# Patient Record
Sex: MALE | Race: WHITE
[De-identification: names, ages, dates, MRNs, and addresses within clinical notes are randomized per-mention and may not be internally consistent; named-entity substitution may affect disease eponyms.]

---

## 2021-08-11 ENCOUNTER — HOSPITAL ENCOUNTER (INPATIENT)
Dept: HOSPITAL 47 - EC | Age: 46
LOS: 3 days | Discharge: HOME | DRG: 247 | End: 2021-08-14
Attending: INTERNAL MEDICINE | Admitting: INTERNAL MEDICINE
Payer: COMMERCIAL

## 2021-08-11 VITALS — BODY MASS INDEX: 28.9 KG/M2

## 2021-08-11 DIAGNOSIS — Z87.891: ICD-10-CM

## 2021-08-11 DIAGNOSIS — I44.7: ICD-10-CM

## 2021-08-11 DIAGNOSIS — Z98.61: ICD-10-CM

## 2021-08-11 DIAGNOSIS — I21.4: Primary | ICD-10-CM

## 2021-08-11 DIAGNOSIS — I25.2: ICD-10-CM

## 2021-08-11 DIAGNOSIS — Z20.822: ICD-10-CM
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
## 2021-08-11 DIAGNOSIS — Z79.899: ICD-10-CM

## 2021-08-11 DIAGNOSIS — Z79.82: ICD-10-CM

## 2021-08-11 DIAGNOSIS — Z79.02: ICD-10-CM

## 2021-08-11 DIAGNOSIS — E78.5: ICD-10-CM

## 2021-08-11 DIAGNOSIS — I25.5: ICD-10-CM

## 2021-08-11 DIAGNOSIS — M19.011: ICD-10-CM

## 2021-08-11 DIAGNOSIS — I25.10: ICD-10-CM

## 2021-08-11 DIAGNOSIS — I10: ICD-10-CM

## 2021-08-11 LAB
ALBUMIN SERPL-MCNC: 4.1 G/DL (ref 3.5–5)
ALP SERPL-CCNC: 51 U/L (ref 38–126)
ALT SERPL-CCNC: 33 U/L (ref 4–49)
ANION GAP SERPL CALC-SCNC: 10 MMOL/L
APTT BLD: 21.3 SEC (ref 22–30)
AST SERPL-CCNC: 40 U/L (ref 17–59)
BASOPHILS # BLD AUTO: 0.1 K/UL (ref 0–0.2)
BASOPHILS NFR BLD AUTO: 1 %
BUN SERPL-SCNC: 21 MG/DL (ref 9–20)
CALCIUM SPEC-MCNC: 9.3 MG/DL (ref 8.4–10.2)
CHLORIDE SERPL-SCNC: 110 MMOL/L (ref 98–107)
CK SERPL-CCNC: 236 U/L (ref 55–170)
CO2 SERPL-SCNC: 21 MMOL/L (ref 22–30)
EOSINOPHIL # BLD AUTO: 0.3 K/UL (ref 0–0.7)
EOSINOPHIL NFR BLD AUTO: 4 %
ERYTHROCYTE [DISTWIDTH] IN BLOOD BY AUTOMATED COUNT: 4.87 M/UL (ref 4.3–5.9)
ERYTHROCYTE [DISTWIDTH] IN BLOOD: 12.6 % (ref 11.5–15.5)
GLUCOSE SERPL-MCNC: 141 MG/DL (ref 74–99)
HCT VFR BLD AUTO: 43.9 % (ref 39–53)
HGB BLD-MCNC: 15.1 GM/DL (ref 13–17.5)
INR PPP: 0.9 (ref ?–1.2)
LYMPHOCYTES # SPEC AUTO: 3.1 K/UL (ref 1–4.8)
LYMPHOCYTES NFR SPEC AUTO: 43 %
MAGNESIUM SPEC-SCNC: 1.9 MG/DL (ref 1.6–2.3)
MCH RBC QN AUTO: 31 PG (ref 25–35)
MCHC RBC AUTO-ENTMCNC: 34.4 G/DL (ref 31–37)
MCV RBC AUTO: 90.2 FL (ref 80–100)
MONOCYTES # BLD AUTO: 0.4 K/UL (ref 0–1)
MONOCYTES NFR BLD AUTO: 6 %
NEUTROPHILS # BLD AUTO: 3.1 K/UL (ref 1.3–7.7)
NEUTROPHILS NFR BLD AUTO: 42 %
PLATELET # BLD AUTO: 204 K/UL (ref 150–450)
POTASSIUM SERPL-SCNC: 3.8 MMOL/L (ref 3.5–5.1)
PROT SERPL-MCNC: 6.6 G/DL (ref 6.3–8.2)
PT BLD: 10.1 SEC (ref 9–12)
SODIUM SERPL-SCNC: 141 MMOL/L (ref 137–145)
TROPONIN I SERPL-MCNC: 0.02 NG/ML (ref 0–0.03)
WBC # BLD AUTO: 7.3 K/UL (ref 3.8–10.6)

## 2021-08-11 PROCEDURE — 82550 ASSAY OF CK (CPK): CPT

## 2021-08-11 PROCEDURE — 80061 LIPID PANEL: CPT

## 2021-08-11 PROCEDURE — 83880 ASSAY OF NATRIURETIC PEPTIDE: CPT

## 2021-08-11 PROCEDURE — 85025 COMPLETE CBC W/AUTO DIFF WBC: CPT

## 2021-08-11 PROCEDURE — 027034Z DILATION OF CORONARY ARTERY, ONE ARTERY WITH DRUG-ELUTING INTRALUMINAL DEVICE, PERCUTANEOUS APPROACH: ICD-10-PCS

## 2021-08-11 PROCEDURE — 93458 L HRT ARTERY/VENTRICLE ANGIO: CPT

## 2021-08-11 PROCEDURE — 80053 COMPREHEN METABOLIC PANEL: CPT

## 2021-08-11 PROCEDURE — B2151ZZ FLUOROSCOPY OF LEFT HEART USING LOW OSMOLAR CONTRAST: ICD-10-PCS

## 2021-08-11 PROCEDURE — 82565 ASSAY OF CREATININE: CPT

## 2021-08-11 PROCEDURE — 36415 COLL VENOUS BLD VENIPUNCTURE: CPT

## 2021-08-11 PROCEDURE — 83735 ASSAY OF MAGNESIUM: CPT

## 2021-08-11 PROCEDURE — 93005 ELECTROCARDIOGRAM TRACING: CPT

## 2021-08-11 PROCEDURE — 71045 X-RAY EXAM CHEST 1 VIEW: CPT

## 2021-08-11 PROCEDURE — 93306 TTE W/DOPPLER COMPLETE: CPT

## 2021-08-11 PROCEDURE — 02C13ZZ EXTIRPATION OF MATTER FROM CORONARY ARTERY, TWO ARTERIES, PERCUTANEOUS APPROACH: ICD-10-PCS

## 2021-08-11 PROCEDURE — 4A023N7 MEASUREMENT OF CARDIAC SAMPLING AND PRESSURE, LEFT HEART, PERCUTANEOUS APPROACH: ICD-10-PCS

## 2021-08-11 PROCEDURE — 82553 CREATINE MB FRACTION: CPT

## 2021-08-11 PROCEDURE — 92973 PRQ TRLUML C MCHN ASP THRMBC: CPT

## 2021-08-11 PROCEDURE — 80048 BASIC METABOLIC PNL TOTAL CA: CPT

## 2021-08-11 PROCEDURE — 85610 PROTHROMBIN TIME: CPT

## 2021-08-11 PROCEDURE — 84484 ASSAY OF TROPONIN QUANT: CPT

## 2021-08-11 PROCEDURE — B2111ZZ FLUOROSCOPY OF MULTIPLE CORONARY ARTERIES USING LOW OSMOLAR CONTRAST: ICD-10-PCS

## 2021-08-11 PROCEDURE — 92921: CPT

## 2021-08-11 PROCEDURE — 85730 THROMBOPLASTIN TIME PARTIAL: CPT

## 2021-08-11 RX ADMIN — MIDAZOLAM ONE MG: 1 INJECTION INTRAMUSCULAR; INTRAVENOUS at 23:52

## 2021-08-11 RX ADMIN — FENTANYL CITRATE ONE MCG: 50 INJECTION, SOLUTION INTRAMUSCULAR; INTRAVENOUS at 23:52

## 2021-08-11 NOTE — ED
Chest Pain HPI





- General


Chief Complaint: Chest Pain


Stated Complaint: Possible STEMI


Time Seen by Provider: 08/11/21 23:05


Source: patient, EMS, RN notes reviewed, old records reviewed


Mode of arrival: EMS


Limitations: no limitations





- History of Present Illness


Initial Comments: 





This is a 46-year-old male to the ER for evaluation patient presents today for 

evaluation regards to significant shortness of breath patient has may be a 

history of high blood pressure, unsure of any other medical history disease.  No

prior history of heart disease but does have a strong family history of heart 

disease a young age with his grandfather having heart attack young age.  Patient

himself has no recent travel history or sick contacts.  No fevers no cough or c

ongestion.  Still short of breath he does feel diaphoretic.  Patient was walking

with his kids when he became significantly diaphoretic and short of breath with 

chest pressure.  Left-sided chest pressure and pain has continued here in the ER

symptoms about a half hour prior to arrival


MD Complaint: chest pain


-: minutes(s) (30)


Onset: during rest, during exertion


Pain Location: left chest


Pain Radiation: none


Severity: moderate


Severity scale (1-10): 7


Quality: tightness, heaviness


Consistency: constant, intermittent


Improves With: nothing


Worsens With: exertion


Anginal Symptoms: nausea, diaphoresis, dyspnea, sense of impending doom


Treatments Prior to Arrival: none





- Related Data


                                    Allergies











Allergy/AdvReac Type Severity Reaction Status Date / Time


 


No Known Allergies Allergy   Verified 08/11/21 23:09














Review of Systems


ROS Statement: 


Those systems with pertinent positive or pertinent negative responses have been 

documented in the HPI.





ROS Other: All systems not noted in ROS Statement are negative.





Past Medical History


History of Any Multi-Drug Resistant Organisms: None Reported


Past Psychological History: No Psychological Hx Reported


Smoking Status: Never smoker


Past Alcohol Use History: None Reported


Past Drug Use History: None Reported





General Exam





- General Exam Comments


Initial Comments: 





Diaphoretic


Limitations: no limitations


General appearance: anxious


Head exam: Present: atraumatic, normocephalic, normal inspection


Eye exam: Present: normal appearance, PERRL, EOMI.  Absent: scleral icterus, 

conjunctival injection, periorbital swelling


ENT exam: Present: normal exam, mucous membranes moist


Neck exam: Present: normal inspection.  Absent: tenderness, meningismus, 

lymphadenopathy


Respiratory exam: Present: normal lung sounds bilaterally.  Absent: respiratory 

distress, wheezes, rales, rhonchi, stridor


Cardiovascular Exam: Present: regular rate, normal rhythm, normal heart sounds. 

Absent: systolic murmur, diastolic murmur, rubs, gallop, clicks


GI/Abdominal exam: Present: soft, normal bowel sounds.  Absent: distended, 

tenderness, guarding, rebound, rigid


Extremities exam: Present: normal inspection, full ROM, normal capillary refill.

 Absent: tenderness, pedal edema, joint swelling, calf tenderness


Back exam: Present: normal inspection


Neurological exam: Present: alert, oriented X3, CN II-XII intact


Psychiatric exam: Present: normal affect, normal mood


Skin exam: Present: warm, dry, intact, normal color.  Absent: rash





Course


                                   Vital Signs











  08/11/21





  23:05


 


Temperature 97.7 F


 


Pulse Rate 87


 


Respiratory 20





Rate 


 


Blood Pressure 136/97


 


O2 Sat by Pulse 98





Oximetry 














- Reevaluation(s)


Reevaluation #1: 





08/11/21 23:21


Medical record is reviewed


08/11/21 23:21


Code STEMI was paged upon arrival with patient significant symptoms


Reevaluation #2: 





08/11/21 23:21


No prior EKG on file


Reevaluation #3: 





08/11/21 23:21


Spoke with patient regarding symptoms and need for heart catheterization, he 

understands





- Consultations


Consultation #1: 





Spoke with Dr. Singletary regarding findings, Cath Lab is paged.


Consultation #2: 





Spoke with Dr. Ac regarding admission he agrees





Chest Pain MDM





- Differential Diagnosis


AMI





- MDM





46 male DF for evaluation of chest pain pressure diaphoresis, patient will be 

admitted to the Cath Lab for heart catheterization regarding symptoms.





Critical Care Time


Critical Care Time: Yes


Total Critical Care Time: 31





Disposition


Clinical Impression: 


 Chest pain, Acute non-ST elevation myocardial infarction (NSTEMI)





Disposition: ADMITTED AS IP TO THIS HOSP


Condition: Serious


Is patient prescribed a controlled substance at d/c from ED?: No


Referrals: 


Francisco Malik MD [Primary Care Provider] - 1-2 days

## 2021-08-11 NOTE — XR
EXAMINATION TYPE: XR chest 1V

 

DATE OF EXAM: 8/11/2021

 

COMPARISON: NONE

 

HISTORY: Chest pain

 

TECHNIQUE: Single view

 

FINDINGS: Heart and mediastinum are normal. There is slight increased interstitial markings. There is
 no pleural effusion. There are no hilar masses. There are chest leads. There is arthritic change in 
the right shoulder joint.

 

IMPRESSION: Mild increased lung markings. No pulmonary consolidation or heart failure.

## 2021-08-11 NOTE — P.CRDCN
History of Present Illness


History of present illness: 





HISTORY OF PRESENTING ILLNESS


This is a pleasant 46-year-old with past medical history significant for family 

history of coronary artery disease and orthopedic complaints.  Patient was campi

ng and walking with his son earlier today and then started to notice shortness 

breath, left-sided chest pain radiating down the arm and associated with 

diaphoresis.  It was somewhat off and on however then became more intense.  

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  

He was given 4 baby aspirin and nitroglycerin with some improvement in his chest

pain.  Initial EKG showed sinus rhythm with left bundle branch block with 

accentuated discordant ST elevations in V3 V4 concerning for Scarbossa's 

criteria however this did improve on repeat EKG by the time he presented to 

emergency department.  Patient still is having ongoing mild chest discomfort 

however much improved from beginning.  He does have a family history of 

grandfather with MI in his 40s however has been checked with supposedly normal 

cholesterol in the past.  He does not smoke, drinks a few beers most days, no 

illicit drugs.  No prior history of left bundle-branch block.








REVIEW OF SYSTEMS


At the time of my exam:


CONSTITUTIONAL: Denies fever or chills.


CARDIOVASCULAR: +chest pain, +shortness of breath, no orthopnea, PND or 

palpitations.


RESPIRATORY: Denies cough. 


GASTROINTESTINAL: Denies abdominal pain, diarrhea, constipation, nausea or 

vomiting.


MUSCULOSKELETAL: Denies myalgias.


NEUROLOGIC: Denies numbness, tingling or weakness.


ENDOCRINE: Denies fatigue, weight change,  polydipsia or polyurina.


GENITOURINARY: Denies burning, hematuria or urgency with micturation.


HEMATOLOGIC: Denies history of anemia or bleeding. 





PHYSICAL EXAMINATION


Vital signs reviewed.


CONSTITUTIONAL: Mild distress. 


HEENT: Head is normocephalic. Pupils are equal, round. Sclerae anicteric. Mucous

membranes of the mouth are moist.  No JVD. No carotid bruit.


CHEST EXAMINATION: Lungs are clear to auscultation. No chest wall tenderness is 

noted on palpation or with deep breathing. 


HEART EXAMINATION: Regular rate and rhythm. S1, S2 heard. No murmurs, gallops or

rub.


ABDOMEN: Soft, nontender. Positive bowel sounds.


EXTREMITIES: 2+ peripheral pulses, no lower extremity edema and no calf 

tenderness.


NEUROLOGIC EXAMINATION: Patient is awake, alert and oriented x3. 





ASSESSMENT


1.  New-onset left bundle branch block with initial EKG concerning for 

Scarbossa's criteria, consistent with STEMI equivalent


2.  Chest pain, diaphoresis, shortness breath concerning for acute coronary 

syndrome


3.  Alcohol use


4.  Family history of coronary artery disease





PLAN


Patient with signs and symptoms consistent with acute coronary syndrome.  EKG 

with new onset left bundle branch block concerning for STEMI equivalent.  

Discussed risks and benefits of heart catheterization and patient is agreeable. 

Check 2-D echo.  Trend troponins.  Further recommendations to follow.











Past Medical History


History of Any Multi-Drug Resistant Organisms: None Reported


Past Psychological History: No Psychological Hx Reported


Smoking Status: Never smoker


Past Alcohol Use History: None Reported


Past Drug Use History: None Reported





Medications and Allergies


                                Home Medications











 Medication  Instructions  Recorded  Confirmed  Type


 


Diclofenac Sodium [Voltaren] 75 mg PO BID 08/11/21 08/11/21 History








                                    Allergies











Allergy/AdvReac Type Severity Reaction Status Date / Time


 


No Known Allergies Allergy   Verified 08/11/21 23:09














Physical Exam


Vitals: 


                                   Vital Signs











  Temp Pulse Resp BP Pulse Ox


 


 08/11/21 23:05  97.7 F  87  20  136/97  98








                                Intake and Output











 08/11/21 08/11/21 08/12/21





 14:59 22:59 06:59


 


Other:   


 


  Weight   97.522 kg














Results





                                 08/11/21 23:15





                                       CBC











  08/11/21 Range/Units





  23:15 


 


WBC  7.3  (3.8-10.6)  k/uL


 


RBC  4.87  (4.30-5.90)  m/uL


 


Hgb  15.1  (13.0-17.5)  gm/dL


 


Hct  43.9  (39.0-53.0)  %


 


Plt Count  204  (150-450)  k/uL








                               Current Medications











Generic Name Dose Route Start Last Admin





  Trade Name Freq  PRN Reason Stop Dose Admin


 


Aspirin  325 mg  08/12/21 09:00 





  Aspirin 325 Mg Tab  PO  





  DAILY SHRUTHI  


 


Sodium Chloride  1,000 mls @ 100 mls/hr  08/11/21 23:10  08/11/21 23:17





  Saline 0.9%  IV  08/12/21 09:09  100 mls/hr





  .Q10H STA   Administration


 


Sodium Chloride  1,000 mls @ 100 mls/hr  08/11/21 23:15  08/11/21 23:19





  Saline 0.9%  IV   Not Given





  .Q10H SHRUTHI  








                                Intake and Output











 08/11/21 08/11/21 08/12/21





 14:59 22:59 06:59


 


Other:   


 


  Weight   97.522 kg








                                 Patient Weight











 08/12/21





 06:59


 


Weight 97.522 kg








                                        





                                 08/11/21 23:15

## 2021-08-12 LAB
ANION GAP SERPL CALC-SCNC: 7 MMOL/L
APTT BLD: 39.8 SEC (ref 22–30)
BASOPHILS # BLD AUTO: 0 K/UL (ref 0–0.2)
BASOPHILS NFR BLD AUTO: 0 %
BUN SERPL-SCNC: 20 MG/DL (ref 9–20)
CALCIUM SPEC-MCNC: 8.9 MG/DL (ref 8.4–10.2)
CHLORIDE SERPL-SCNC: 108 MMOL/L (ref 98–107)
CHOLEST SERPL-MCNC: 151 MG/DL (ref 0–200)
CO2 SERPL-SCNC: 22 MMOL/L (ref 22–30)
EOSINOPHIL # BLD AUTO: 0 K/UL (ref 0–0.7)
EOSINOPHIL NFR BLD AUTO: 0 %
ERYTHROCYTE [DISTWIDTH] IN BLOOD BY AUTOMATED COUNT: 4.48 M/UL (ref 4.3–5.9)
ERYTHROCYTE [DISTWIDTH] IN BLOOD: 13.2 % (ref 11.5–15.5)
GLUCOSE BLD-MCNC: 127 MG/DL (ref 75–99)
GLUCOSE SERPL-MCNC: 162 MG/DL (ref 74–99)
HCT VFR BLD AUTO: 40.2 % (ref 39–53)
HDLC SERPL-MCNC: 38 MG/DL (ref 40–60)
HGB BLD-MCNC: 13.8 GM/DL (ref 13–17.5)
INR PPP: 1 (ref ?–1.2)
LDLC SERPL CALC-MCNC: 100.2 MG/DL (ref 0–131)
LYMPHOCYTES # SPEC AUTO: 0.9 K/UL (ref 1–4.8)
LYMPHOCYTES NFR SPEC AUTO: 11 %
MCH RBC QN AUTO: 30.7 PG (ref 25–35)
MCHC RBC AUTO-ENTMCNC: 34.2 G/DL (ref 31–37)
MCV RBC AUTO: 89.9 FL (ref 80–100)
MONOCYTES # BLD AUTO: 0.5 K/UL (ref 0–1)
MONOCYTES NFR BLD AUTO: 6 %
NEUTROPHILS # BLD AUTO: 7.1 K/UL (ref 1.3–7.7)
NEUTROPHILS NFR BLD AUTO: 82 %
PLATELET # BLD AUTO: 214 K/UL (ref 150–450)
POTASSIUM SERPL-SCNC: 4 MMOL/L (ref 3.5–5.1)
PT BLD: 10.6 SEC (ref 9–12)
SODIUM SERPL-SCNC: 137 MMOL/L (ref 137–145)
TRIGL SERPL-MCNC: 64 MG/DL (ref 0–149)
VLDLC SERPL CALC-MCNC: 12.8 MG/DL (ref 5–40)
WBC # BLD AUTO: 8.7 K/UL (ref 3.8–10.6)

## 2021-08-12 RX ADMIN — HYDRALAZINE HYDROCHLORIDE ONE MG: 20 INJECTION INTRAMUSCULAR; INTRAVENOUS at 01:09

## 2021-08-12 RX ADMIN — HYDRALAZINE HYDROCHLORIDE ONE MG: 20 INJECTION INTRAMUSCULAR; INTRAVENOUS at 01:14

## 2021-08-12 RX ADMIN — ATORVASTATIN CALCIUM SCH MG: 80 TABLET, FILM COATED ORAL at 08:24

## 2021-08-12 RX ADMIN — NITROGLYCERIN ONE MCG: 10 INJECTION INTRAVENOUS at 00:28

## 2021-08-12 RX ADMIN — METOPROLOL TARTRATE SCH MG: 25 TABLET, FILM COATED ORAL at 08:38

## 2021-08-12 RX ADMIN — TICAGRELOR SCH MG: 90 TABLET ORAL at 08:38

## 2021-08-12 RX ADMIN — MIDAZOLAM ONE MG: 1 INJECTION INTRAMUSCULAR; INTRAVENOUS at 00:52

## 2021-08-12 RX ADMIN — NITROGLYCERIN ONE MCG: 10 INJECTION INTRAVENOUS at 00:40

## 2021-08-12 RX ADMIN — METOPROLOL TARTRATE SCH MG: 25 TABLET, FILM COATED ORAL at 21:32

## 2021-08-12 RX ADMIN — NITROGLYCERIN ONE MCG: 10 INJECTION INTRAVENOUS at 00:17

## 2021-08-12 RX ADMIN — HEPARIN SODIUM ONE UNIT: 1000 INJECTION INTRAVENOUS; SUBCUTANEOUS at 00:33

## 2021-08-12 RX ADMIN — NITROGLYCERIN ONE MCG: 10 INJECTION INTRAVENOUS at 00:48

## 2021-08-12 RX ADMIN — NITROGLYCERIN ONE MCG: 10 INJECTION INTRAVENOUS at 01:05

## 2021-08-12 RX ADMIN — SPIRONOLACTONE SCH MG: 25 TABLET, FILM COATED ORAL at 08:24

## 2021-08-12 RX ADMIN — FUROSEMIDE SCH MG: 20 TABLET ORAL at 21:31

## 2021-08-12 RX ADMIN — HEPARIN SODIUM SCH MLS/HR: 10000 INJECTION, SOLUTION INTRAVENOUS at 23:30

## 2021-08-12 RX ADMIN — FUROSEMIDE SCH MG: 20 TABLET ORAL at 08:24

## 2021-08-12 RX ADMIN — HEPARIN SODIUM SCH MLS/HR: 10000 INJECTION, SOLUTION INTRAVENOUS at 03:11

## 2021-08-12 RX ADMIN — FENTANYL CITRATE ONE MCG: 50 INJECTION, SOLUTION INTRAMUSCULAR; INTRAVENOUS at 00:52

## 2021-08-12 RX ADMIN — HEPARIN SODIUM ONE UNIT: 1000 INJECTION INTRAVENOUS; SUBCUTANEOUS at 00:01

## 2021-08-12 RX ADMIN — TICAGRELOR SCH MG: 90 TABLET ORAL at 21:31

## 2021-08-12 RX ADMIN — NITROGLYCERIN ONE MCG: 10 INJECTION INTRAVENOUS at 00:37

## 2021-08-12 RX ADMIN — HEPARIN SODIUM PRN UNIT: 1000 INJECTION, SOLUTION INTRAVENOUS; SUBCUTANEOUS at 08:24

## 2021-08-12 RX ADMIN — ASPIRIN 81 MG CHEWABLE TABLET SCH MG: 81 TABLET CHEWABLE at 08:24

## 2021-08-12 RX ADMIN — NITROGLYCERIN ONE MCG: 10 INJECTION INTRAVENOUS at 00:53

## 2021-08-12 RX ADMIN — METOPROLOL TARTRATE SCH MG: 25 TABLET, FILM COATED ORAL at 02:37

## 2021-08-12 RX ADMIN — HEPARIN SODIUM ONE UNIT: 1000 INJECTION INTRAVENOUS; SUBCUTANEOUS at 00:12

## 2021-08-12 RX ADMIN — CEFAZOLIN SCH MLS/HR: 330 INJECTION, POWDER, FOR SOLUTION INTRAMUSCULAR; INTRAVENOUS at 03:13

## 2021-08-12 NOTE — P.PRCINT
Percutaneous Coronary Int.





- Percutaneous Coronary Intervention


Percutaneous Coronary Intervention: 





PROCEDURES PERFORMED: Left heart catheterization, bilateral coronary 

angiography, PCI proximal LAD with a 3.25 x 18mm Xience LOLY, with kissing 

balloon angioplasty of LAD and diagonal 2 branch, Penumbra aspiration thromb

ectomy





INDICATION: ACS, STEMI equivalent





HISTORY: Patient is pleasant 46-year-old male with a family history of 

grandfather with MI in his 40s, occasional alcohol use who presented with chest 

pain while walking to his children while camping.  Chest pain was somewhat off 

and on however he recognizes symptoms early and presented to ER with new left 

bundle-branch block, shortness breath and diaphoresis and therefore STEMI alert 

was notified.  Given new left bundle-branch block in typical symptoms left heart

catheterization was recommended.





CONSENT:I have discussed the risks, benefits and alternative therapies for the 

above-mentioned procedure and for both sedation/analgesia as well as necessary 

blood product administration, if indicated, as they pertain to this patient.  

The patient has indicated understanding and acceptance of the risks and 

procedures discussed.





PROCEDURE: After the risks, benefits and alternatives of the above mentioned 

procedure explained in detail with the patient, informed consent was obtained.  

Patient was taken to the catheterization lab and prepped and draped in usual 

fashion.  1% lidocaine was used to anesthetize the right radial artery.  A 6-

[place] sheath was placed in the right radial artery using modified Seldinger 

technique.  Left coronary angiography was performed with a 5-[place] JL 3.5 

catheter and right coronary angiography was performed with a 5-[place] JR5 

catheter in various views.  A 5-[place] FR5 catheter was inserted into the left 

ventricle and pressure measurements were obtained.  





The decision was made to perform PCI of the LAD.  A 6-[place] CLS 3.5 guide was 

used to engage the left main.  Heparin was given for ACT greater than 250.  A 

0.014 BMW wire was advanced into the distal LAD and penumbra aspiration was 

performed given high thrombus burden.  There was additional involvement of the 

diagonal 2 branch and therefore a 0.014 whisper wire was inserted into the 

distal diagonal 2 branch.  Balloon angioplasty was performed of the ostium of 

the diagonal 2 branch with a 2.5 x 12 mm wound and then a 3.0 x 15 mm balloon.  

Next balloon angioplasty was performed of the LAD with a 3.0 x 15 mm balloon.  

Next a 3.25 x 18 mm Xience LOLY was placed just distal to the diagonal 1 branch 

and jailing the diagonal 2 branch.  There was significant "pinching" of the 

diagonal 2 branch and therefore a second 0.014 BMW wire was advanced into the 

diagonal 2 branch and the initial whisper wire was removed.  Next balloon 

angioplasty was performed of the diagonal 2 branch with a 2.0 x 12 mm balloon 

through the stent struts.  Next a kissing angioplasty was performed with a 3.0 x

12 mm noncompliant balloon in the LAD and a 2.5 x 15 mm noncompliant balloon in 

the diagonal 2 branch.  The wires were pulled and final angiograms were 

performed.  Preintervention there was GRAHAM 3 flow of the LAD and diagonal 2 

branch with 95% stenosis of the LAD and 95% stenosis of the diagonal 2 branch 

and postintervention there was less than 10% stenosis of the LAD and 30% 

stenosis of the diagonal 2 branch which was ballooned with GRAHAM 3 flow of both. 

Patient was still having mild chest pain throughout the case felt related to 

distal embolization and microvascular dysfunction.





The right radial sheath was removed and a TR band was placed with hemostasis 

achieved.  The patient tolerated the procedure well.  Patient was transported 

back to the post catheterization holding area in stable condition.





Conscious Sedation: Patient was monitored under the direct supervision of vision

of myself for conscious sedation using Versed and fentanyl for a total duration 

of 90 minutes   


HEMODYNAMICS: 


Ao: 128/92


LV: 130/12, LVEDP 31 mmHG





SELECTIVE CORONARY ARTERIOGRAPHY: 


LEFT MAIN: The left main is a large caliber vessel which bifurcates into the LAD

and circumflex.  There is no significant stenosis.


LEFT ANTERIOR DESCENDING CORONARY ARTERY: LAD is a large caliber vessel which 

wraps around to the apex.  There is a proximal LAD 95% stenosis extending into 

the diagonal 2 branch with a large amount of thrombus.  Diagonal 2 has a 95% 

ostial stenosis.  There is mid LAD 20% stenosis and otherwise normal LAD.


LEFT CIRCUMFLEX CORONARY ARTERY: Left circumflex is a large caliber vessel with 

only mild luminal irregularities.  The circumflex supplies the PDA and is the 

dominant vessel.  There are mild luminal irregularities of the circumflex. 


RIGHT CORONARY ARTERY: The right coronary artery is a small caliber vessel which

gives off an acute marginal branch and has no signficant stenosis and is 

nondominant.  








FINAL IMPRESSION: 


1.  CAD as described above including proximal LAD 95% and diagonal 2 95% 

stenosis at the level of the LAD/diagonal 2 bifurcation. 


2.  Severely elevated left filling pressures


3.  S/p PCI proximal LAD with a 3.25 x 18mm Xience LOLY, with kissing balloon 

angioplasty of LAD and diagonal 2 branch





PLAN: 


1.  Aggressive risk factor modification per most recent ACC/AHA guidelines.


2.  Continue dual antiplatelets for 12 months.

## 2021-08-12 NOTE — PN
PROGRESS NOTE



Mr. Dunne is 46-year-old male with history of premature coronary artery disease who

presented with symptoms of chest discomfort and left bundle branch block, underwent

cardiac catheterization by Dr. Singletary, was found to have significant obstructive

disease involving the LAD and underwent stenting of the LAD using a 3.25 by 18 mm

Xience 5 point stent with balloon angioplasty of the diagonal branch.  He had some

chest discomfort postprocedure.  This morning his chest discomfort is almost resolved

completely.  His left ventricular end-diastolic pressure was elevated.  His breathing

is better this morning.  He is being maintained on IV heparin and IV nitroglycerin.  He

denies any dizziness or palpitation.  He continues to be in sinus mechanism.  He has no

palpitation.  Denies any nausea or vomiting.  He has no prior cardiac history.  He

continues to be at this time on IV heparin, IV nitroglycerin, aspirin once a day,

metoprolol tartrate 25 mg twice a day, Brilinta 90 mg twice a day.



PHYSICAL EXAMINATION:

Blood pressure running in the low 100s with a heart rate in 70s.

LUNGS:  Clear.

HEART:  Regular rate and rhythm.  S1, S2.  No S3.  No rub appreciated.

ABDOMEN:  Soft, nontender.

EXTREMITIES:  No edema.  Right radial pulse intact.



LAB DATA:

Lab data revealed troponin of 11.6, BUN and creatinine 20 and 0.8, hemoglobin is 13.8.



IMPRESSION:

1. Status post anterior myocardial infarction and stenting of the LAD and angioplasty

    of the diagonal branch.

2. Elevated left ventricular end-diastolic pressure consistent with the acute ischemic

    event.

3. Family history of premature coronary disease.



RECOMMENDATION:

From the cardiac standpoint I will wean the IV nitroglycerin.  I will start him on oral

nitrate and ACE inhibitor.  I will add Aldactone and a statin to his regimen. An

echocardiogram with Doppler will be obtained.  Depending on his progress, further

recommendation will be made.



MMODL / IJN: [id number] / Job#: [id number]

## 2021-08-12 NOTE — ECHOF
Referral Reason:mi



MEASUREMENTS

--------

HEIGHT: 188.0 cm

WEIGHT: 102.1 kg

BP: 102/62

RVIDd:   3.0 cm     (< 3.3)

IVSd:   1.6 cm     (0.6 - 1.1)

LVIDd:   5.2 cm     (3.9 - 5.3)

LVPWd:   1.4 cm     (0.6 - 1.1)

IVSs:   1.9 cm

LVIDs:   3.7 cm

LVPWs:   1.9 cm

LA Diam:   3.7 cm     (2.7 - 3.8)

LAESV Index (A-L):   25.07 ml/m

Ao Diam:   3.5 cm     (2.0 - 3.7)

AV Cusp:   2.6 cm     (1.5 - 2.6)

MV EXCURSION:   20.477 mm     (> 18.000)

MV EF SLOPE:   130 mm/s     (70 - 150)

EPSS:   0.7 cm

MV E Sixto:   0.84 m/s

MV DecT:   151 ms

MV A Sixto:   0.73 m/s

MV E/A Ratio:   1.15 

TAPSE:   19.78 mm







FINDINGS

--------

Sinus rhythm.

This was a technically good study.

The left ventricular size is normal.   There is moderate concentric left ventricular hypertrophy.   O
verall left ventricular systolic function is moderately impaired with, an EF between 35 - 40 %.   Api
oracio anterior LV wall motion is hypokinetic.    Apical lateral LV wall motion is hypokinetic.    Apica
l inferior LV wall motion is hypokinetic.    Apical septum LV wall motion is hypokinetic.

The right ventricle is normal in size.

Normal LA  size by volume 22+/-6 ml/m2.

The right atrium is normal in size.

Interatrial and interventricular septum intact.

The aortic valve is trileaflet, and appears structurally normal. No aortic stenosis or regurgitation.


There is trace to mild mitral regurgitation.

The tricuspid valve appears structurally normal.   Unable to estimate RVSP due to inadequate TR jet s
pectral doppler profile.

The pulmonic valve is normal.

The aortic root size is normal.

Normal inferior vena cava with normal inspiratory collapse consistent with estimated right atrial pre
ssure of  5 mmHg.

There is no pericardial effusion.



CONCLUSIONS

--------

1. The left ventricular size is normal.

2. There is moderate concentric left ventricular hypertrophy.

3. Overall left ventricular systolic function is moderately impaired with, an EF between 35 - 40 %.

4. Apical anterior LV wall motion is hypokinetic.

5. Apical lateral LV wall motion is hypokinetic.

6. Apical inferior LV wall motion is hypokinetic.

7. Apical septum LV wall motion is hypokinetic.

8. The aortic valve is trileaflet, and appears structurally normal. No aortic stenosis or regurgitati
on.

9. There is trace to mild mitral regurgitation.

10. There is no pericardial effusion.





SONOGRAPHER: Corrie Gauthier RDCS

## 2021-08-12 NOTE — P.HPIM
History of Present Illness


H&P Date: 21








HISTORY OF PRESENT ILLNESS


This is a 46-year-old male patient of Dr. Malik with history of OA right 

shoulder s/p injections.  Patient states that he was camping with his family and

he was carrying his son started developing a little bit of chest pain which she 

has had before but it usually goes away.  He went to carry his second son who is

lighter in weight had increasing tightness, pain in the left arm, sweats, 

nausea, palpitations and lightheadedness.  EMS was contacted and patient was 

transferred to [facility] emergency center for evaluation.  Vi

jina signs are stable.  EKG was a left bundle branch block with ST elevation in 

V3 V4, cardiology was contacted and patient was taken emergently to the cath lab

and underwent left heart catheterization, bilateral coronary angiography, PCI 

proximal LAD with a 3.25 x 18mm Xience LOLY, with kissing balloon angioplasty of 

LAD and diagonal 2 branch, Penumbra aspiration thrombectomy with Dr. Singletary.  

Patient is currently maintained in the intensive care unit on heparin drip and 

nitroglycerin drip.  He states he occasionally gets some chest pain on the left 

side.  He has been hemodynamically stable.





REVIEW OF SYSTEMS


Constitutional: No fever, no chills, Reported sweats.  No weight change.  No 

weakness, fatigue or lethargy.  No daytime sleepiness.


EENT: No headache.  No blurred vision or double vision, no loss of vision.  No 

dizziness.  No nasal drainage or congestion.  No epistaxis.  No sore throat.


Lungs: No shortness of breath, cough, no sputum production.  No wheezing.


Cardiovascular: Reported chest pain, no lower extremity edema.  No palpitations.

 No paroxysmal nocturnal dyspnea.  No orthopnea.  No lightheadedness or 

dizziness.  No syncopal episodes.


Abdominal: No abdominal pain.  Reported nausea, vomiting.  No diarrhea.  No 

constipation.  No bloody or tarry stools.  No loss of appetite.


Genitourinary: No dysuria, increased frequency, urgency.  No urinary retention.


Musculoskeletal: No myalgias.  No muscle weakness, no gait dysfunction, no 

frequent falls.  No back pain.  No neck pain.


Integumentary: No wounds, no lesions.  No rash or pruritus.  No unusual 

bruising.  No change in hair or nails.


Neurologic: No aphasia. No facial droop. No change in mentation. No head injury.

No headache. No paralysis. No paresthesia.


Psychiatric: No depression.  No anxiety.  No mood swings.


Endocrine: No abnormal blood sugars.   





MEDICAL HISTORY


Osteoarthritis right shoulder





SURGICAL HISTORY


Ankle surgery





SOCIAL HISTORY


Patients smoked for a few years while he was in the [employer] at less than pack per 

day for 5 years.  He does drink alcohol about 3 drinks on his nights off work.  

Patient lives at home with his wife and 2 sons.  He works as a  at

the [employer].





FAMILY HISTORY


Mother is alive at age 68 with history of hypertension and smoking.  Father 

at age 58 from consultations from diabetes.  Patient has a total of 6 siblings 

and one sister has passed at age 33 from a brain cancer.  All other siblings are

alive with no major medical problems and no cardiac history.  Patient has 1 

paternal grandfather that  of a myocardial infarction in his 30s.  Patient 

has 2 sons and one 7-year-old has neuroblastoma.  Second son has no major 

medical problems.





PHYSICAL EXAMINATION


Gen: This is a 46-year-old  male.  He is resting in the ICU bed and 

appears to be comfortable and in no acute distress.


HEENT: Head is atraumatic, normocephalic. Pupils equal, round. Sclerae is 

anicteric. 


NECK: Supple. No JVD. No lymphadenopathy. No thyromegaly. 


LUNGS: Clear to auscultation. No wheezes or rhonchi.  No intercostal 

retractions.


HEART: Regular rate and rhythm. No murmur. 


ABDOMEN: Soft. Bowel sounds are present. No masses.  No tenderness.


EXTREMITIES: No pedal edema.  No calf tenderness.  Dorsalis pedis +2 

bilaterally.


NEUROLOGICAL: Patient is awake, alert and oriented x3. Cranial nerves 2 through 

12 are grossly intact. 





ASSESSMENT AND PLAN


1. New-onset left bundle branch block with initial EKG concerning for 

Scarbossa's criteria, consistent with STEMI equivalent.  Status post left heart 

catheterization, bilateral coronary angiography, PCI LAD with kissing balloon 

angioplasty of the LAD and diagonal 2 branch, aspiration thrombectomy.  Patient 

is maintained in the intensive care unit.  He is currently on heparin drip and 

nitroglycerin drip.  Continue aspirin 81 mg daily, Lipitor 80 mg daily, Lasix 20

mg twice daily, Imdur 30 mg daily, lisinopril 2.5 mg twice daily, Lopressor 25 

mg twice daily, Aldactone 25 mg daily, Brilinta 90 mg twice daily.  

Echocardiogram is pending





2.  Osteoarthritis right shoulder, stable.





3.  Alcohol use.





4.  GI prophylaxis.  Protonix.





5.  DVT prophylaxis.  Heparin.








DISCHARGE PLAN


Home





Impression and plan of care have been directed as dictated by the signing 

physician.  Judi Feng nurse practitioner acting as scribe for signing 

physician.








Past Medical History


Past Medical History: Hypertension, Myocardial Infarction (MI)


Last Myocardial Infarction Date:: 21


History of Any Multi-Drug Resistant Organisms: None Reported


Past Surgical History: Heart Catheterization With Stent


Date of Last Stent Placement:: 21


Past Psychological History: No Psychological Hx Reported


Smoking Status: Former smoker


Past Alcohol Use History: Occasional


Additional Past Alcohol Use History / Comment(s): drinks approximately 10 

"drinks" per week


Past Drug Use History: None Reported





- Past Family History


  ** Father


Family Medical History: Diabetes Mellitus





  ** Mother


Family Medical History: Hypertension





Medications and Allergies


                                Home Medications











 Medication  Instructions  Recorded  Confirmed  Type


 


Diclofenac Sodium [Voltaren] 75 mg PO BID 21 History


 


Ticagrelor [Brilinta] 90 mg PO BID #60 tab 21  Rx








                                    Allergies











Allergy/AdvReac Type Severity Reaction Status Date / Time


 


No Known Allergies Allergy   Verified 21 23:09














Physical Exam


Vitals: 


                                   Vital Signs











  Temp Pulse Resp BP Pulse Ox


 


 21 09:00   73  16  116/82  96


 


 21 08:00  98 F  66  16  117/75  97


 


 21 07:00   75  11 L  102/62  97


 


 21 06:45   76  11 L  105/60  97


 


 21 06:30   73  18  114/74  97


 


 21 06:15   72  19  115/71  97


 


 21 06:00  98.5 F  68  16  111/59  97


 


 21 05:45   77  16  106/63  97


 


 21 05:30   84  17  110/63  97


 


 21 05:15   75  17  123/79  96


 


 21 05:00   71  17  116/72  96


 


 21 04:45   75  14  123/75  96


 


 21 04:30   82  16  119/78  96


 


 21 04:15   80  19  123/78  97


 


 21 04:00  98.5 F  84  16  124/70  98


 


 21 03:45   89  11 L  116/73  97


 


 21 03:41      96


 


 21 03:30   86  11 L  108/74  96


 


 21 03:15   86  10 L  112/72  94 L


 


 21 03:00   78  12  112/71  97


 


 21 02:45   84  23  114/71  95


 


 21 02:30   82  15   95


 


 21 02:20    18  


 


 21 02:15   84  13  127/85  96


 


 21 02:00   81  12  119/81  96


 


 21 23:35   78  12  141/103  98


 


 21 23:30   80  12  131/101  98


 


 21 23:25   79  12  136/104  99


 


 21 23:15   80  12  133/100  98


 


 21 23:05  97.7 F  87  20  136/97  98








                                Intake and Output











 21





 22:59 06:59 14:59


 


Intake Total  820 239.25


 


Output Total  1200 


 


Balance  -380 239.25


 


Intake:   


 


  IV  820 40


 


    Sodium Chloride 0.9% 1,  120 40





    000 ml @ 20 mls/hr IV .   





    Q24H SHRUTHI Rx#:[id number]   


 


  Intake, IV Titration   199.25





  Amount   


 


    Heparin Sod,Pork in 0.45%   46





    NaCl 25,000 unit In 0.45   





    % NaCl 1 250ml.bag @ 10.   





    254 UNITS/KG/HR 10 mls/hr   





    IV .Q24H SHRUTHI Rx#:   





    [id number]   


 


    Nitroglycerin-D5w Pmx 50   153.25





    mg In Dextrose/Water 1   





    250ml.bag @ 100 MCG/MIN   





    30 mls/hr IV .Q8H20M SHRUTHI   





    Rx#:[id number]   


 


Output:   


 


  Urine  1200 


 


Other:   


 


  # Voids  1 


 


  Weight  102.3 kg 102.3 kg














Results


CBC & Chem 7: 


                                 21 03:23





                                 21 03:28


Labs: 


                  Abnormal Lab Results - Last 24 Hours (Table)











  21 Range/Units





  23:15 23:15 01:49 


 


Lymphocytes #     (1.0-4.8)  k/uL


 


APTT  21.3 L    (22.0-30.0)  sec


 


Chloride   110 H   ()  mmol/L


 


Carbon Dioxide   21 L   (22-30)  mmol/L


 


BUN   21 H   (9-20)  mg/dL


 


Glucose   141 H   (74-99)  mg/dL


 


POC Glucose (mg/dL)    127 H  (75-99)  mg/dL


 


Creatine Kinase   236 H   ()  U/L


 


Troponin I     (0.000-0.034)  ng/mL


 


HDL Cholesterol     (40.0-60.0)  mg/dL














  21 Range/Units





  03:23 03:23 03:23 


 


Lymphocytes #    0.9 L  (1.0-4.8)  k/uL


 


APTT     (22.0-30.0)  sec


 


Chloride     ()  mmol/L


 


Carbon Dioxide     (22-30)  mmol/L


 


BUN     (9-20)  mg/dL


 


Glucose     (74-99)  mg/dL


 


POC Glucose (mg/dL)     (75-99)  mg/dL


 


Creatine Kinase     ()  U/L


 


Troponin I  11.600 H*    (0.000-0.034)  ng/mL


 


HDL Cholesterol   38.0 L   (40.0-60.0)  mg/dL














  21 Range/Units





  03:23 03:28 07:17 


 


Lymphocytes #     (1.0-4.8)  k/uL


 


APTT  39.8 H   33.3 H  (22.0-30.0)  sec


 


Chloride   108 H   ()  mmol/L


 


Carbon Dioxide     (22-30)  mmol/L


 


BUN     (9-20)  mg/dL


 


Glucose   162 H   (74-99)  mg/dL


 


POC Glucose (mg/dL)     (75-99)  mg/dL


 


Creatine Kinase     ()  U/L


 


Troponin I     (0.000-0.034)  ng/mL


 


HDL Cholesterol     (40.0-60.0)  mg/dL














  21 Range/Units





  07:17 


 


Lymphocytes #   (1.0-4.8)  k/uL


 


APTT   (22.0-30.0)  sec


 


Chloride   ()  mmol/L


 


Carbon Dioxide   (22-30)  mmol/L


 


BUN   (9-20)  mg/dL


 


Glucose   (74-99)  mg/dL


 


POC Glucose (mg/dL)   (75-99)  mg/dL


 


Creatine Kinase   ()  U/L


 


Troponin I  23.100 H*  (0.000-0.034)  ng/mL


 


HDL Cholesterol   (40.0-60.0)  mg/dL














Thrombosis Risk Factor Assmnt





- Choose All That Apply


Each Factor Represents 1 point: Acute MI, Age 41-60 years, Obesity (BMI >25)


Thrombosis Risk Factor Assessment Total Risk Factor Score: 3


Thrombosis Risk Factor Assessment Level: Moderate Risk

## 2021-08-13 VITALS — RESPIRATION RATE: 16 BRPM

## 2021-08-13 RX ADMIN — SPIRONOLACTONE SCH MG: 25 TABLET, FILM COATED ORAL at 09:19

## 2021-08-13 RX ADMIN — HEPARIN SODIUM PRN UNIT: 1000 INJECTION, SOLUTION INTRAVENOUS; SUBCUTANEOUS at 06:03

## 2021-08-13 RX ADMIN — CEFAZOLIN SCH MLS/HR: 330 INJECTION, POWDER, FOR SOLUTION INTRAMUSCULAR; INTRAVENOUS at 06:05

## 2021-08-13 RX ADMIN — FUROSEMIDE SCH MG: 20 TABLET ORAL at 09:19

## 2021-08-13 RX ADMIN — TICAGRELOR SCH MG: 90 TABLET ORAL at 09:19

## 2021-08-13 RX ADMIN — ATORVASTATIN CALCIUM SCH MG: 80 TABLET, FILM COATED ORAL at 09:19

## 2021-08-13 RX ADMIN — METOPROLOL TARTRATE SCH MG: 25 TABLET, FILM COATED ORAL at 20:38

## 2021-08-13 RX ADMIN — METOPROLOL TARTRATE SCH MG: 25 TABLET, FILM COATED ORAL at 09:19

## 2021-08-13 RX ADMIN — ASPIRIN 81 MG CHEWABLE TABLET SCH MG: 81 TABLET CHEWABLE at 09:19

## 2021-08-13 RX ADMIN — TICAGRELOR SCH MG: 90 TABLET ORAL at 20:38

## 2021-08-13 NOTE — PN
PROGRESS NOTE



Mr. Dunne is a 46-year-old male with no prior documented history of coronary 
artery

disease who presented with an acute myocardial infarction. He underwent coronary

angiography and stenting of the LAD by Dr. Singletary.  He is doing well this 
morning.

He has no chest pain. His breathing has been stable. He denies any dizziness or

palpitation. He denies any nausea.  He continues to be in sinus mechanism. He 
continues

to be on aspirin once a day, Lipitor 80 mg daily, Lasix 20 mg twice a day, IV

heparin, isosorbide mononitrate 30 mg daily, lisinopril 2.5 mg twice a day, 
metoprolol

tartrate 25 mg twice a day, spironolactone 25 mg daily, Brilinta 90 mg twice a 
day.



PHYSICAL EXAMINATION:

Blood pressure 103/60 with a heart rate in the 70s.

LUNGS:  Clear.

HEART: Regular rate and rhythm. S1, S2.  No S3.  No rub.

ABDOMEN:  Soft, nontender.

EXTREMITIES:  No edema.



LAB DATA:

Lab data revealed a peak troponin of 28 and his NT-proBNP was 1270.  His 
echocardiogram

revealed an ejection fraction of 35% to 40% with controlled apical and 
inferoapical

hypokinesis consistent with his myocardial infarction.



IMPRESSION:

1. Status post myocardial infarction involving the LAD, status post stenting.

2. Ischemic cardiomyopathy.  Hopefully stunt  myocardium in view of the low

    peak of his troponin.

3. Family history of premature coronary disease.



RECOMMENDATIONS:

I will stop the heparin.  I will stop the nitrates at this time, decrease the 
dose of

the diuretic, and increase the dose of his lisinopril.  He should be able to be

transferred to the telemetry floor and increase his level activity. Depending on
his

progress, further recommendations will be made.





MMODL / IJN: [id number] / Job#: [id number]

MTDD

## 2021-08-13 NOTE — P.PN
Subjective


Progress Note Date: 08/13/21





HISTORY OF PRESENT ILLNESS


This is a 46-year-old male patient of Dr. Malik with history of OA right 

shoulder s/p injections.  Patient states that he was camping with his family and

he was carrying his son started developing a little bit of chest pain which she 

has had before but it usually goes away.  He went to carry his second son who is

lighter in weight had increasing tightness, pain in the left arm, sweats, 

nausea, palpitations and lightheadedness.  EMS was contacted and patient was 

transferred to [facility] emergency center for evaluation.  

Vital signs are stable.  EKG was a left bundle branch block with ST elevation in

V3 V4, cardiology was contacted and patient was taken emergently to the cath lab

and underwent left heart catheterization, bilateral coronary angiography, PCI 

proximal LAD with a 3.25 x 18mm Xience LOLY, with kissing balloon angioplasty of 

LAD and diagonal 2 branch, Penumbra aspiration thrombectomy with Dr. Singletary.  

Patient is currently maintained in the intensive care unit on heparin drip and 

nitroglycerin drip.  He states he occasionally gets some chest pain on the left 

side.  He has been hemodynamically stable.





8/13: Echocardiogram reveals EF of 35-40% with moderate concentric left 

hypertrophy, mild mitral regurgitation.  Patient remains in the intensive care 

unit.  No chest pain, no shortness of breath, no lightheadedness or dizziness.  

Patient is off nitroglycerin drip and off heparin drip.  Patient is to be t

ransferred to the cardiac stepdown unit.  Patient has been afebrile, heart rate 

79, blood pressure 122/67, pulse ox 97% on room air.





REVIEW OF SYSTEMS


Constitutional: No fever, no chills, Reported sweats.  No weight change.  No 

weakness, fatigue or lethargy.  No daytime sleepiness.


EENT: No headache.  No blurred vision or double vision, no loss of vision.  No 

dizziness.  No nasal drainage or congestion.  No epistaxis.  No sore throat.


Lungs: No shortness of breath, cough, no sputum production.  No wheezing.


Cardiovascular: Denies chest pain, no lower extremity edema.  No palpitations.  

No paroxysmal nocturnal dyspnea.  No orthopnea.  No lightheadedness or 

dizziness.  No syncopal episodes.


Abdominal: No abdominal pain.  Reported nausea, vomiting.  No diarrhea.  No 

constipation.  No bloody or tarry stools.  No loss of appetite.


Genitourinary: No dysuria, increased frequency, urgency.  No urinary retention.


Musculoskeletal: No myalgias.  No muscle weakness, no gait dysfunction, no 

frequent falls.  No back pain.  No neck pain.


Integumentary: No wounds, no lesions.  No rash or pruritus.  No unusual 

bruising.  No change in hair or nails.


Neurologic: No aphasia. No facial droop. No change in mentation. No head injury.

No headache. No paralysis. No paresthesia.


Psychiatric: No depression.  No anxiety.  No mood swings.


Endocrine: No abnormal blood sugars.   





PHYSICAL EXAMINATION


Gen: This is a 46-year-old  male.  He is resting in the ICU bed and 

appears to be comfortable and in no acute distress.


HEENT: Head is atraumatic, normocephalic. Pupils equal, round. Sclerae is 

anicteric. 


NECK: Supple. No JVD. No lymphadenopathy. No thyromegaly. 


LUNGS: Clear to auscultation. No wheezes or rhonchi.  No intercostal 

retractions.


HEART: Regular rate and rhythm. No murmur. 


ABDOMEN: Soft. Bowel sounds are present. No masses.  No tenderness.


EXTREMITIES: No pedal edema.  No calf tenderness.  Dorsalis pedis +2 

bilaterally.


NEUROLOGICAL: Patient is awake, alert and oriented x3. Cranial nerves 2 through 

12 are grossly intact. 





ASSESSMENT AND PLAN


1. New-onset left bundle branch block with initial EKG concerning for 

Scarbossa's criteria, consistent with STEMI equivalent.  Status post left heart 

catheterization, bilateral coronary angiography, PCI LAD with kissing balloon 

angioplasty of the LAD and diagonal 2 branch, aspiration thrombectomy.  Patient 

is maintained in the intensive care unit.   Continue aspirin 81 mg daily, 

Lipitor 80 mg daily, Lasix 20 mg twice daily, Imdur 30 mg daily, lisinopril 2.5 

mg twice daily, Lopressor 25 mg twice daily, Aldactone 25 mg daily, Brilinta 90 

mg twice daily.  Echocardiogram as above.  Transfer to cardiac stepdown unit





2.  Osteoarthritis right shoulder, stable.





3.  Alcohol use.





4.  GI prophylaxis.  Protonix.





5.  DVT prophylaxis.  Heparin.








DISCHARGE PLAN


Home





Impression and plan of care have been directed as dictated by the signing 

physician.  Judi Feng nurse practitioner acting as scribe for signing 

physician.








Objective





- Vital Signs


Vital signs: 


                                   Vital Signs











Temp  98.6 F   08/13/21 08:00


 


Pulse  72   08/13/21 09:00


 


Resp  21   08/13/21 08:00


 


BP  101/59   08/13/21 08:00


 


Pulse Ox  96   08/13/21 09:00








                                 Intake & Output











 08/12/21 08/13/21 08/13/21





 18:59 06:59 18:59


 


Intake Total 419.25 546.527 20


 


Output Total 1650 2325 0


 


Balance -1230.75 -1778.473 20


 


Weight 102.3 kg 97.4 kg 


 


Intake:   


 


   260 20


 


    Sodium Chloride 0.9% 1, 220 260 20





    000 ml @ 20 mls/hr IV .   





    Q24H SHRUTHI Rx#:[id number]   


 


  Intake, IV Titration 199.25 286.527 





  Amount   


 


    Heparin Sod,Pork in 0.45% 46 286.527 





    NaCl 25,000 unit In 0.45   





    % NaCl 1 250ml.bag @ 10.   





    254 UNITS/KG/HR 10 mls/hr   





    IV .Q24H SHRUTHI Rx#:   





    [id number]   


 


    Nitroglycerin-D5w Pmx 50 153.25  





    mg In Dextrose/Water 1   





    250ml.bag @ 100 MCG/MIN   





    30 mls/hr IV .Q8H20M SHRUTHI   





    Rx#:[id number]   


 


Output:   


 


  Urine 1650 2325 0


 


Other:   


 


  # Voids  1 














- Labs


CBC & Chem 7: 


                                 08/12/21 03:23





                                 08/13/21 03:45


Labs: 


                  Abnormal Lab Results - Last 24 Hours (Table)











  08/12/21 08/12/21 08/12/21 Range/Units





  09:47 13:01 13:01 


 


APTT    55.2 H  (22.0-30.0)  sec


 


Troponin I  28.000 H*  18.700 H*   (0.000-0.034)  ng/mL














  08/13/21 Range/Units





  03:45 


 


APTT  34.2 H  (22.0-30.0)  sec


 


Troponin I   (0.000-0.034)  ng/mL

## 2021-08-14 VITALS — TEMPERATURE: 97.9 F | HEART RATE: 95 BPM

## 2021-08-14 VITALS — SYSTOLIC BLOOD PRESSURE: 110 MMHG | DIASTOLIC BLOOD PRESSURE: 69 MMHG

## 2021-08-14 LAB
ANION GAP SERPL CALC-SCNC: 9 MMOL/L
BUN SERPL-SCNC: 14 MG/DL (ref 9–20)
CALCIUM SPEC-MCNC: 9.7 MG/DL (ref 8.4–10.2)
CHLORIDE SERPL-SCNC: 104 MMOL/L (ref 98–107)
CO2 SERPL-SCNC: 26 MMOL/L (ref 22–30)
GLUCOSE BLD-MCNC: 84 MG/DL (ref 75–99)
GLUCOSE SERPL-MCNC: 106 MG/DL (ref 74–99)
POTASSIUM SERPL-SCNC: 4.2 MMOL/L (ref 3.5–5.1)
SODIUM SERPL-SCNC: 139 MMOL/L (ref 137–145)

## 2021-08-14 RX ADMIN — ASPIRIN 81 MG CHEWABLE TABLET SCH MG: 81 TABLET CHEWABLE at 08:45

## 2021-08-14 RX ADMIN — SPIRONOLACTONE SCH MG: 25 TABLET, FILM COATED ORAL at 08:45

## 2021-08-14 RX ADMIN — FUROSEMIDE SCH MG: 20 TABLET ORAL at 08:44

## 2021-08-14 RX ADMIN — ATORVASTATIN CALCIUM SCH MG: 80 TABLET, FILM COATED ORAL at 08:45

## 2021-08-14 RX ADMIN — METOPROLOL TARTRATE SCH MG: 25 TABLET, FILM COATED ORAL at 08:45

## 2021-08-14 RX ADMIN — TICAGRELOR SCH MG: 90 TABLET ORAL at 08:45

## 2021-08-14 NOTE — P.DS
Providers


Date of admission: 


08/11/21 23:10





Expected date of discharge: 08/14/21


Attending physician: 


Kobe Ac





Consults: 





                                        





08/11/21 23:10


Consult Physician Urgent 


   Consulting Provider: Tiffanie Leslie


   Consult Reason/Comments: stemi


   Do you want consulting provider notified?: Yes





08/12/21 01:50


Consult Physician Routine 


   Consulting Provider: [facility]


   Consult Reason/Comments: Post Interventional patient


   Do you want consulting provider notified?: Already Contacted











Primary care physician: 


Francisco Malik MD





Hospital Course: 





HISTORY OF PRESENT ILLNESS


This is a 46-year-old male patient of Dr. Malik with history of OA right 

shoulder s/p injections.  Patient states that he was camping with his family and

he was carrying his son started developing a little bit of chest pain which she 

has had before but it usually goes away.  He went to carry his second son who is

lighter in weight had increasing tightness, pain in the left arm, sweats, 

nausea, palpitations and lightheadedness.  EMS was contacted and patient was 

transferred to [facility] emergency center for evaluation.  

Vital signs are stable.  EKG was a left bundle branch block with ST elevation in

V3 V4, cardiology was contacted and patient was taken emergently to the cath lab

and underwent left heart catheterization, bilateral coronary angiography, PCI pr

oximal LAD with a 3.25 x 18mm Xience LOLY, with kissing balloon angioplasty of 

LAD and diagonal 2 branch, Penumbra aspiration thrombectomy with Dr. Singletary.  

Patient is currently maintained in the intensive care unit on heparin drip and 

nitroglycerin drip.  He states he occasionally gets some chest pain on the left 

side.  He has been hemodynamically stable.





8/13: Echocardiogram reveals EF of 35-40% with moderate concentric left 

hypertrophy, mild mitral regurgitation.  Patient remains in the intensive care 

unit.  No chest pain, no shortness of breath, no lightheadedness or dizziness.  

Patient is off nitroglycerin drip and off heparin drip.  Patient is to be 

transferred to the cardiac stepdown unit.  Patient has been afebrile, heart rate

79, blood pressure 122/67, pulse ox 97% on room air.





8/14: Patient denies having any chest pain, no shortness of breath.  He is 

ambulated and has not experienced lightheadedness or dizziness.  He has been 

afebrile, heart rate 95, blood pressure 110/69, pulse ox 97% on room air.  

Repeat blood work reveals normal BMP.  Blood sugar 106.  Patient will be 

discharged home today in stable condition.








ASSESSMENT AND PLAN


1. New-onset left bundle branch block with initial EKG concerning for 

Scarbossa's criteria, consistent with STEMI equivalent.  Status post left heart 

catheterization, bilateral coronary angiography, PCI LAD with kissing balloon 

angioplasty of the LAD and diagonal 2 branch, aspiration thrombectomy. 


2.  Osteoarthritis right shoulder, stable.


3.  Alcohol use.








DISCHARGE PLAN


Home





Impression and plan of care have been directed as dictated by the signing 

physician.  Judi Feng nurse practitioner acting as scribe for signing 

physician.








Patient Condition at Discharge: Good





Plan - Discharge Summary


New Discharge Prescriptions: 


New


   Ticagrelor [Brilinta] 90 mg PO BID #60 tab


   Spironolactone [Aldactone] 25 mg PO DAILY #30 tab


   Atorvastatin [Lipitor] 80 mg PO DAILY #30 tab


   Metoprolol Tartrate [Lopressor] 25 mg PO BID #60 tab


   Nitroglycerin Sl Tabs [Nitrostat] 0.4 mg SUBLINGUAL Q5M PRN #25 tab


     PRN Reason: Chest Pain


   lisinopriL [Zestril] 5 mg PO BID #60 tab


   Aspirin 81 mg PO DAILY  chew


   Furosemide [Lasix] 20 mg PO DAILY #30 tab





Discontinued


   Diclofenac Sodium [Voltaren] 75 mg PO BID


Discharge Medication List





Ticagrelor [Brilinta] 90 mg PO BID #60 tab 08/12/21 [Rx]


Aspirin 81 mg PO DAILY  chew 08/14/21 [Rx]


Atorvastatin [Lipitor] 80 mg PO DAILY #30 tab 08/14/21 [Rx]


Furosemide [Lasix] 20 mg PO DAILY #30 tab 08/14/21 [Rx]


Metoprolol Tartrate [Lopressor] 25 mg PO BID #60 tab 08/14/21 [Rx]


Nitroglycerin Sl Tabs [Nitrostat] 0.4 mg SUBLINGUAL Q5M PRN #25 tab 08/14/21 

[Rx]


Spironolactone [Aldactone] 25 mg PO DAILY #30 tab 08/14/21 [Rx]


lisinopriL [Zestril] 5 mg PO BID #60 tab 08/14/21 [Rx]








Follow up Appointment(s)/Referral(s): 


Hugo Singletary DO [STAFF PHYSICIAN] - 1 Week


Francisco Malik MD [Primary Care Provider] - 1 Week


Patient Instructions/Handouts:  Heart Attack (DC), Heart Failure (ER), Heart 

Healthy Diet (ED)


Discharge Disposition: HOME SELF-CARE Xanax script called in to [facility] in [place].

## 2021-08-14 NOTE — P.PN
Subjective





HISTORY OF PRESENTING ILLNESS


This is a pleasant 46-year-old with past medical history significant for family 

history of coronary artery disease and orthopedic complaints.  Patient was 

camping and walking with his son earlier today and then started to notice 

shortness breath, left-sided chest pain radiating down the arm and associated 

with diaphoresis.  It was somewhat off and on however then became more intense. 

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  

He was given 4 baby aspirin and nitroglycerin with some improvement in his chest

pain.  Initial EKG showed sinus rhythm with left bundle branch block with 

accentuated discordant ST elevations in V3 V4 concerning for Scarbossa's 

criteria however this did improve on repeat EKG by the time he presented to 

emergency department.  Patient still is having ongoing mild chest discomfort 

however much improved from beginning.  He does have a family history of 

grandfather with MI in his 40s however has been checked with supposedly normal 

cholesterol in the past.  He does not smoke, drinks a few beers most days, no 

illicit drugs.  No prior history of left bundle-branch block.





8/14


Patient seen and examined.  Patient denies any further chest pain or pressure.  

He denies any shortness breath.  He has been walking the halls without 

difficulty.  He is status post PCI of the LAD with kissing balloon of the 

diagonal branch.  He did have moderate decrease in ejection fraction 35-40%.  He

is interested in when he can go back to work as he works as a , 

.








REVIEW OF SYSTEMS


At the time of my exam:


CONSTITUTIONAL: Denies fever or chills.


CARDIOVASCULAR: +chest pain, +shortness of breath, no orthopnea, PND or 

palpitations.


RESPIRATORY: Denies cough. 


GASTROINTESTINAL: Denies abdominal pain, diarrhea, constipation, nausea or vo

miting.


MUSCULOSKELETAL: Denies myalgias.


NEUROLOGIC: Denies numbness, tingling or weakness.


ENDOCRINE: Denies fatigue, weight change,  polydipsia or polyurina.


GENITOURINARY: Denies burning, hematuria or urgency with micturation.


HEMATOLOGIC: Denies history of anemia or bleeding. 





PHYSICAL EXAMINATION


Vital signs reviewed.


CONSTITUTIONAL: Mild distress. 


HEENT: Head is normocephalic. Pupils are equal, round. Sclerae anicteric. Mucous

membranes of the mouth are moist.  No JVD. No carotid bruit.


CHEST EXAMINATION: Lungs are clear to auscultation. No chest wall tenderness is 

noted on palpation or with deep breathing. 


HEART EXAMINATION: Regular rate and rhythm. S1, S2 heard. No murmurs, gallops or

rub.


ABDOMEN: Soft, nontender. Positive bowel sounds.


EXTREMITIES: 2+ peripheral pulses, no lower extremity edema and no calf 

tenderness.


NEUROLOGIC EXAMINATION: Patient is awake, alert and oriented x3. 





ASSESSMENT


1.  Acute coronary syndrome, STEMI equivalent status post PCI LAD


2.  Hyperlipidemia


3.  Alcohol use


4.  Family history of coronary artery disease


5.  Ischemic cardiomyopathy ejection fraction 35-40%





PLAN


Patient without any further angina-type symptoms.  Continue with dual an

tiplatelets.  Continue with heart failure regimen as tolerated.  Patient appears

stable for discharge home from a cardiology standpoint.





Objective





- Vital Signs


Vital signs: 


                                   Vital Signs











Temp  97.9 F   08/14/21 12:00


 


Pulse  95   08/14/21 12:00


 


Resp  16   08/14/21 12:00


 


BP  110/69   08/14/21 12:00


 


Pulse Ox  97   08/14/21 12:00








                                 Intake & Output











 08/13/21 08/14/21 08/14/21





 18:59 06:59 18:59


 


Intake Total 20  1160


 


Output Total 0  300


 


Balance 20  860


 


Weight  94.7 kg 


 


Intake:   


 


  IV 20  40


 


    Sodium Chloride 0.9% 1, 20  40





    000 ml @ 20 mls/hr IV .   





    Q24H SHRUTHI Rx#:[id number]   


 


  Oral   1120


 


Output:   


 


  Urine 0  300


 


Other:   


 


  # Voids 0  2


 


  # Bowel Movements 4  














- Labs


CBC & Chem 7: 


                                 08/12/21 03:23





                                 08/14/21 07:04


Labs: 


                  Abnormal Lab Results - Last 24 Hours (Table)











  08/14/21 Range/Units





  07:04 


 


Glucose  106 H  (74-99)  mg/dL